# Patient Record
Sex: MALE | Race: WHITE
[De-identification: names, ages, dates, MRNs, and addresses within clinical notes are randomized per-mention and may not be internally consistent; named-entity substitution may affect disease eponyms.]

---

## 2019-08-28 ENCOUNTER — HOSPITAL ENCOUNTER (INPATIENT)
Dept: HOSPITAL 1 - ED | Age: 74
LOS: 4 days | Discharge: ANOTHER HOSPITAL | DRG: 853 | End: 2019-09-01
Attending: INTERNAL MEDICINE | Admitting: INTERNAL MEDICINE
Payer: COMMERCIAL

## 2019-08-28 VITALS
HEIGHT: 71 IN | BODY MASS INDEX: 32.72 KG/M2 | WEIGHT: 233.69 LBS | BODY MASS INDEX: 32.72 KG/M2 | HEIGHT: 71 IN | WEIGHT: 233.69 LBS

## 2019-08-28 DIAGNOSIS — E86.0: ICD-10-CM

## 2019-08-28 DIAGNOSIS — M72.6: ICD-10-CM

## 2019-08-28 DIAGNOSIS — E87.6: ICD-10-CM

## 2019-08-28 DIAGNOSIS — E11.65: ICD-10-CM

## 2019-08-28 DIAGNOSIS — H40.9: ICD-10-CM

## 2019-08-28 DIAGNOSIS — A41.9: Primary | ICD-10-CM

## 2019-08-28 DIAGNOSIS — N40.1: ICD-10-CM

## 2019-08-28 DIAGNOSIS — D64.9: ICD-10-CM

## 2019-08-28 DIAGNOSIS — E87.1: ICD-10-CM

## 2019-08-28 DIAGNOSIS — I10: ICD-10-CM

## 2019-08-28 DIAGNOSIS — N17.0: ICD-10-CM

## 2019-08-28 DIAGNOSIS — R33.8: ICD-10-CM

## 2019-08-28 DIAGNOSIS — I65.22: ICD-10-CM

## 2019-08-28 DIAGNOSIS — E43: ICD-10-CM

## 2019-08-28 DIAGNOSIS — M10.9: ICD-10-CM

## 2019-08-28 DIAGNOSIS — Z79.84: ICD-10-CM

## 2019-08-28 DIAGNOSIS — J06.9: ICD-10-CM

## 2019-08-28 DIAGNOSIS — N45.1: ICD-10-CM

## 2019-08-28 DIAGNOSIS — R55: ICD-10-CM

## 2019-08-28 LAB
ALBUMIN SERPL-MCNC: 2.1 G/DL (ref 3.4–5)
ALP SERPL-CCNC: 89 U/L (ref 46–116)
ALT SERPL-CCNC: 18 U/L (ref 16–63)
AST SERPL-CCNC: 16 U/L (ref 15–37)
BASOPHILS NFR BLD: 0 % (ref 0–2)
BILIRUB SERPL-MCNC: 0.5 MG/DL (ref 0.2–1)
BUN SERPL-MCNC: 39 MG/DL (ref 7–18)
CALCIUM SERPL-MCNC: 7.8 MG/DL (ref 8.5–10.1)
CHLORIDE SERPL-SCNC: 94 MMOL/L (ref 98–107)
CO2 SERPL-SCNC: 26.3 MMOL/L (ref 21–32)
CREAT SERPL-MCNC: 3 MG/DL (ref 0.7–1.3)
ERYTHROCYTE [DISTWIDTH] IN BLOOD BY AUTOMATED COUNT: 13.9 % (ref 11.5–14.5)
GFR SERPLBLD BASED ON 1.73 SQ M-ARVRAT: (no result) ML/MIN
GLUCOSE SERPL-MCNC: 440 MG/DL (ref 74–106)
MICROSCOPIC UR-IMP: YES
MONOCYTES NFR BLD: 5 % (ref 0–7)
NEUTS BAND NFR BLD: 6 % (ref 0–10)
NEUTS SEG NFR BLD MANUAL: 78 % (ref 37–75)
PLATELET # BLD: 203 X10^3MCL (ref 130–400)
POTASSIUM SERPL-SCNC: 3.2 MMOL/L (ref 3.5–5.1)
PROT SERPL-MCNC: 6.8 G/DL (ref 6.4–8.2)
RBC # UR STRIP.AUTO: NEGATIVE /UL
RBC MORPH BLD: NORMAL
SODIUM SERPL-SCNC: 135 MMOL/L (ref 136–145)
UA SPECIFIC GRAVITY: 1.01 (ref 1–1.03)

## 2019-08-28 PROCEDURE — G0378 HOSPITAL OBSERVATION PER HR: HCPCS

## 2019-08-28 PROCEDURE — A4628 OROPHARYNGEAL SUCTION CATH: HCPCS

## 2019-08-28 PROCEDURE — C9113 INJ PANTOPRAZOLE SODIUM, VIA: HCPCS

## 2019-08-28 NOTE — NUR
PT BIBA WITH C/O NEAR SYNCOPAL EPISODE. PT STS I WAS GETTING UP FROM COUCH AND
BECAME WEAK AND FELL TO MY KNEES. DENIES ANY LOC. DENIES ANY TRAUMA. PT STS AMIRAH
BEEN FEELING SICK SINCE SUNDAY. PT STS HAVING TESTICLE PAIN AND SWELLING X2
DAYS. PER MEDIC, PTS BS TAKEN EN ROUTE 505. PER MEDIC, IV INSERTED TO LFA G 18,
300ML BOLUS GIVEN EN ROUTE. PT IS A/O X4, SPEECH CLEAR AND APPROPRIATE. SCROTAL
EDEMA NOTED, SIZE OF BASEBALL. PLACED ON FULL CM. WILL CONTINUE TO MONITOR.

## 2019-08-29 VITALS — DIASTOLIC BLOOD PRESSURE: 67 MMHG | SYSTOLIC BLOOD PRESSURE: 101 MMHG

## 2019-08-29 VITALS — SYSTOLIC BLOOD PRESSURE: 101 MMHG | DIASTOLIC BLOOD PRESSURE: 62 MMHG

## 2019-08-29 VITALS — DIASTOLIC BLOOD PRESSURE: 59 MMHG | SYSTOLIC BLOOD PRESSURE: 104 MMHG

## 2019-08-29 VITALS — SYSTOLIC BLOOD PRESSURE: 120 MMHG | DIASTOLIC BLOOD PRESSURE: 74 MMHG

## 2019-08-29 VITALS — DIASTOLIC BLOOD PRESSURE: 76 MMHG | SYSTOLIC BLOOD PRESSURE: 157 MMHG

## 2019-08-29 VITALS — SYSTOLIC BLOOD PRESSURE: 149 MMHG | DIASTOLIC BLOOD PRESSURE: 73 MMHG

## 2019-08-29 VITALS — DIASTOLIC BLOOD PRESSURE: 60 MMHG | SYSTOLIC BLOOD PRESSURE: 96 MMHG

## 2019-08-29 VITALS — DIASTOLIC BLOOD PRESSURE: 74 MMHG | SYSTOLIC BLOOD PRESSURE: 120 MMHG

## 2019-08-29 VITALS — SYSTOLIC BLOOD PRESSURE: 137 MMHG | DIASTOLIC BLOOD PRESSURE: 75 MMHG

## 2019-08-29 VITALS — SYSTOLIC BLOOD PRESSURE: 95 MMHG | DIASTOLIC BLOOD PRESSURE: 58 MMHG

## 2019-08-29 VITALS — SYSTOLIC BLOOD PRESSURE: 152 MMHG | DIASTOLIC BLOOD PRESSURE: 76 MMHG

## 2019-08-29 VITALS — DIASTOLIC BLOOD PRESSURE: 65 MMHG | SYSTOLIC BLOOD PRESSURE: 109 MMHG

## 2019-08-29 LAB
BUN SERPL-MCNC: 41 MG/DL (ref 7–18)
CALCIUM SERPL-MCNC: 7.4 MG/DL (ref 8.5–10.1)
CHLORIDE SERPL-SCNC: 96 MMOL/L (ref 98–107)
CO2 SERPL-SCNC: 26.9 MMOL/L (ref 21–32)
CREAT SERPL-MCNC: 3.1 MG/DL (ref 0.7–1.3)
ERYTHROCYTE [DISTWIDTH] IN BLOOD BY AUTOMATED COUNT: 14.3 % (ref 11.5–14.5)
GFR SERPLBLD BASED ON 1.73 SQ M-ARVRAT: (no result) ML/MIN
GLUCOSE SERPL-MCNC: 459 MG/DL (ref 74–106)
MAGNESIUM SERPL-MCNC: 1.4 MG/DL (ref 1.8–2.4)
MAGNESIUM SERPL-MCNC: 1.5 MG/DL (ref 1.8–2.4)
NEUTS BAND NFR BLD: 3 % (ref 0–10)
NEUTS SEG NFR BLD MANUAL: 94 % (ref 37–75)
PHOSPHATE SERPL-MCNC: 2.9 MG/DL (ref 2.5–4.9)
PHOSPHATE SERPL-MCNC: 4.2 MG/DL (ref 2.5–4.9)
PLATELET # BLD: 195 X10^3MCL (ref 130–400)
POTASSIUM SERPL-SCNC: 3.8 MMOL/L (ref 3.5–5.1)
RBC MORPH BLD: NORMAL
SODIUM SERPL-SCNC: 135 MMOL/L (ref 136–145)

## 2019-08-29 PROCEDURE — 0V950ZZ DRAINAGE OF SCROTUM, OPEN APPROACH: ICD-10-PCS | Performed by: SURGERY

## 2019-08-29 NOTE — NUR
TITRATED FENT TO 0.5 MCG/KG/HR AND VERSED TO 0.5 MG/HR DUE TO PT'S BLOOD
PRESSURE TRENDING DOWN OF MAP TO HIGH 60'S.

## 2019-08-29 NOTE — NUR
RECEIVED A CALL FROM SIMRAN(LAB STAFF) WITH CRITICAL RESULT LACTIC-2.6.
PAGED (RESIDENT) ASSIGNED TO THIS PT, AWAITING FOR HER RETURN
CALL.
RADHA RN ASSIGNED TO THIS PT MADE AWARE OF ABOVE.

## 2019-08-29 NOTE — NUR
PT BACK FROM CT ACCOMPANIED BY RT AND SURU RN. PER SURU RN, PT BECAME AGITATED
AND RESTLESS AND TITRATED FENT TO 0.8 MCG/KG/HR AND VERSED TO 0.8 MG/HR. VSS
STABLE. NO ACUTE DISTRESS NOTED.

## 2019-08-29 NOTE — NUR
PER DR. TODD TO PLACE OGT INTO PT FOR TUBE FEEDINGS. OGT 16 [place] MEASURED,
INSERTED, AND AUSCULTATED. ORDER FOR KUB ORDERED AND AWAITING FOR
VERIFICATION.

## 2019-08-29 NOTE — NUR
PT C/O 10/10 TESTICULAR PAIN DESCRIBED AS THROBBING. MEDICATED WITH PRN
DILAUDID PER MAR. /57, HR 90. REASSESSED PAIN. PT STATES NO RELIEF OF
PAIN RATES PAIN AT 8/10. STATES FEELING WARM. TEMP CHECK 99.1, AC TURNED ON.
CURRENT BP 97/60, HR 89, RESP 19. REQUESTING ICE PACK FOR TESTICLES, ICE PACK
PROVIDED. WILL CONTINUE TO MONITOR.

## 2019-08-29 NOTE — NUR
RECEIVED PT FROM NIGHT SHIFT NURSE. PT RESTING IN BED, AOX4, RESP E/U ON RA.
CURRENTYL UNDERGOING ULTRASOUND AT BEDSIDE. NO SIGNS OF ACUTE DISTRESS NOTED.
IV TO LFA W/ NO SIGNS OF INFILTRATION, IVF INFUSING WELL. BED IN LOWEST
POSITION AND CALL LIGHT WITHIN REACH. WILL CONTINUE TO MONITOR.

## 2019-08-29 NOTE — NUR
PHYSICAL THERAPY NOTE
ATTEMPTED FOR PHYSICAL THERAPY EVAL AS REQUESTED; PATIENT IS TAKEN FOR MEDICAL
PROCEDURE PER RADHA SOTO.

## 2019-08-29 NOTE — NUR
REC'D REPORT FROM EVELIO SOTO TO ASSUME CARE. PT INTUBATED AND SEDATED ON VERSED
0.8 MG/HR, FENTANYL 0.8 MCG/KG/HR WITH RSS 4. PT ABLE TO FOLLOW COMMANDS WHEN
AWAKE. PERRLA NOTED. 8.0 ETT SECURED AND 26 CM LL. NO JVD NOTED. TRACHEA
MIDLINE. OGT INTACT AND PATENT. RIJ CVC INTACT, PORTS PATENT, DSG CDI.  ETT TO
VENT: AC MODE RATE 12, , PEEP 5, FIO2 40%. RESPS E/U. CHEST RISE EQUAL
AND SYMMETRICAL. LUNG SOUNDS EXP CLEAR BUL, DIM BASES. CARDIAC MONITOR IN
PLACE SHOWING NSR WITH HR 83. /67 MAP 78. PULSES PALPABLE X4.  CAP
REFILL < 3 SECS. NO EDEMA NOTED. IVF NS INFUSING  ML/HR. BLE SCDS IN
PLACE. TF WILL BE INITIATED TONIGHT. ABD ROUND, SOFT, NONTENDER TO TOUCH.
BOWEL SOUNDS ACTIVE. NO N/V NOTED. F/C INTACT AND DRAINING VIA GRAVITY YELLOW
URINE. SCROTAL EDEMA NOTED.  NO PENILE DISCHARGE NOTED. S/P I&D TODAY TO
SCROTUM FOR NECROTIZING FASCITIS, MODERATE AMT OF SEROSANGUINOUS DRAINAGE
NOTED ON CHUX, F/C CARE PROVIDED, LINENS CHANGED. GEN WEAKNESS NOTED. TURNED
AND REPOSITIONED Q2H FOR PRESSURE RELIEF. PTS RUE RESTRAINTS TAKEN OFF AT THIS
TIME, LUE RESTRAINT IN PLACE. PTS WIFE AT BEDSIDE. PT AGREES TO NOT PULL OUT
ANY TUBING OR MEDICAL DEVICE. PT IN FULL VIEW OF NURSING STATION FOR CLOSE
MONITORING. ALL NEEDS MET AT THIS TIME. WILL CONTINUE TO MONITOR.

## 2019-08-29 NOTE — NUR
PT LACTIC ACID CAME BACK, CURRENT LACTIC ACID 3.8. DR MOON MADE AWARE. NO
NEW ORDERS RECEIVED. WILL CONTINUE TO MONITOR.

## 2019-08-29 NOTE — NUR
PT ARRIVED FROM OR S/P I&D OF SCROTUM ACCOMPANIED BY OR NURSES AT THIS TIME.
PT INTUBATED WITH NO SEDATION. UNABLE TO FOLLOW COMMANDS. PUPILS 2MM IN
SIZE AND SLUGGISH IN RESPONSE TO LIGHT B/E. 8.0 ETT @ 26 LL. BREATHING E/U.
SYMM CHEST WALL EXPANSION NOTED. NO S/S OF RESP DISTRESS NOTED. VS: TEMP =
98.2, HR = 101, BP = 152/76 (91), RR = 17, O2 = 98%. MOD PALPABLE PULSES X4.
CAP REFILL <3 SEC. RIJ TLC CVC INTACT, X3 PORTS PATENT, DRESSING CDI. PIV TO R
HAND AND L WRIST, PORTS PATENT, DRESSINGS CDI. F/C INTACT AND DRAINING VIA
GRAVITY. URINE IS YELLOW IN COLOR. INCISION NOTED TO SCROTUM WITH IODOFORM
PACKING AND ABD PAD DRESSING. WILL ASSUME CARE OF PT.

## 2019-08-29 NOTE — NUR
B/E SOFT WRIST RESTRAINTS APPLIED TO PT FOR PT'S SAFETY. TWO-FINGER WIDTH
UNDER RESTRAINTS, PT ABLE TO MOVE FINGERS, NO CYANOSIS PRESENT, CAP REFILL <3
SEC TO BUE NOTED. SEE RESTRAINT SHEET FOR DETAILS.

## 2019-08-29 NOTE — NUR
RECEIVED PT FROM [facility], CAME IN DUE TO SYNCOPE, FEVER, AND SCROTAL
EDEMA. AAOX4. DENIES HEADACHE/DIZZINESS. ABLE TO FOLLOW COMMANDS. SPEECH IS
CLEAR. NO FACIAL DROOP. HAND  EQUAL. NO ARM DRIFT NOTED. NO SOB, LUNG
SOUNDS CTA, O2 SAT=98%, RA. DENIES CHEST PAIN/PRESSURE, SR ON THE MONITOR.
STATED THAT HE HAD NAUSEA SINCE SATURDAY AND HAD DIARRHEAL EPISODES X2-3 DAYS.
LAST BM YESTERDAY AND HAD X1 DIARRHEAL EPISODE. ABDOMEN IS SOFT AND ROUND.
DENIES DYSURIA BUT SCROTAL EDEMA NOTED. C/O 10/10 THROBBING AND PRESSURE
TESTICULAR PAIN. IV SITE PATENT AND INTACT. SIDE RAILS UPX2. CALL LIGHT ON
REACH. HOB ELEVATED AT 30 DEG. WIFE AT BEDSIDE. TEMP-99.4. ENDORSED TO PRIMARY
NURSE MOISE FOR CONTINUITY OF CARE

## 2019-08-29 NOTE — NUR
CLARIFIED ORDER FOR SECOND NS BOLUS WITH DR VENTURA, PER DR. ORDER IS
CORRECT DUE TO PT BEING ON SEPSIS PROTOCAL. 1000CC BOLUS NS GIVEN. PT HAS NOT
URINATED SINCE ARRIVED TO UNIT. DENIES FEELING TO URINATE. BLADDER SCAN DONE,
SHOWS 200CC. NO BLADDER DISTENTION NOTED. DENIES PAIN ON PALPATION. WILL
CONTINUE TO MONITOR URINE OUTPUT. STATES SCROTAL PAIN IS BETTER. PROVIDED ICE
PACK FOR TESTICLES, PT STATES ICE PACKS ARE HELPING WITH PAIN. ALL NEEDS
ASSESSED AND ATTENDED. NO ACUTE DISTRESS NOTED. BED AT LOWEST SETTING. SIDE
RAILS X2 UP. CALL LIGHT WITHING REACH. WILL ENDORSE CARE TO AM NURSE.

## 2019-08-30 VITALS — DIASTOLIC BLOOD PRESSURE: 65 MMHG | SYSTOLIC BLOOD PRESSURE: 111 MMHG

## 2019-08-30 VITALS — SYSTOLIC BLOOD PRESSURE: 104 MMHG | DIASTOLIC BLOOD PRESSURE: 71 MMHG

## 2019-08-30 VITALS — DIASTOLIC BLOOD PRESSURE: 56 MMHG | SYSTOLIC BLOOD PRESSURE: 88 MMHG

## 2019-08-30 VITALS — SYSTOLIC BLOOD PRESSURE: 128 MMHG | DIASTOLIC BLOOD PRESSURE: 71 MMHG

## 2019-08-30 VITALS — SYSTOLIC BLOOD PRESSURE: 99 MMHG | DIASTOLIC BLOOD PRESSURE: 72 MMHG

## 2019-08-30 VITALS — SYSTOLIC BLOOD PRESSURE: 158 MMHG | DIASTOLIC BLOOD PRESSURE: 95 MMHG

## 2019-08-30 VITALS — SYSTOLIC BLOOD PRESSURE: 98 MMHG | DIASTOLIC BLOOD PRESSURE: 64 MMHG

## 2019-08-30 VITALS — DIASTOLIC BLOOD PRESSURE: 59 MMHG | SYSTOLIC BLOOD PRESSURE: 97 MMHG

## 2019-08-30 VITALS — SYSTOLIC BLOOD PRESSURE: 91 MMHG | DIASTOLIC BLOOD PRESSURE: 55 MMHG

## 2019-08-30 VITALS — DIASTOLIC BLOOD PRESSURE: 76 MMHG | SYSTOLIC BLOOD PRESSURE: 125 MMHG

## 2019-08-30 VITALS — SYSTOLIC BLOOD PRESSURE: 167 MMHG | DIASTOLIC BLOOD PRESSURE: 87 MMHG

## 2019-08-30 VITALS — SYSTOLIC BLOOD PRESSURE: 103 MMHG | DIASTOLIC BLOOD PRESSURE: 63 MMHG

## 2019-08-30 VITALS — DIASTOLIC BLOOD PRESSURE: 64 MMHG | SYSTOLIC BLOOD PRESSURE: 98 MMHG

## 2019-08-30 LAB
BUN SERPL-MCNC: 45 MG/DL (ref 7–18)
CALCIUM SERPL-MCNC: 6.8 MG/DL (ref 8.5–10.1)
CHLORIDE SERPL-SCNC: 104 MMOL/L (ref 98–107)
CO2 SERPL-SCNC: 28 MMOL/L (ref 21–32)
CREAT SERPL-MCNC: 2.1 MG/DL (ref 0.7–1.3)
ERYTHROCYTE [DISTWIDTH] IN BLOOD BY AUTOMATED COUNT: 14.6 % (ref 11.5–14.5)
GFR SERPLBLD BASED ON 1.73 SQ M-ARVRAT: (no result) ML/MIN
GLUCOSE SERPL-MCNC: 174 MG/DL (ref 74–106)
MAGNESIUM SERPL-MCNC: 1.7 MG/DL (ref 1.8–2.4)
MONOCYTES NFR BLD: 5 % (ref 0–7)
NEUTS BAND NFR BLD: 6 % (ref 0–10)
NEUTS SEG NFR BLD MANUAL: 85 % (ref 37–75)
PHOSPHATE SERPL-MCNC: 2.6 MG/DL (ref 2.5–4.9)
PLAT MORPH BLD: (no result)
PLATELET # BLD: 195 X10^3MCL (ref 130–400)
POTASSIUM SERPL-SCNC: 3.3 MMOL/L (ref 3.5–5.1)
RBC MORPH BLD: NORMAL
SODIUM SERPL-SCNC: 139 MMOL/L (ref 136–145)

## 2019-08-30 NOTE — NUR
Recommendation(s):
1. Add Ensure High Protein BID
2. Add Abraham QD
3. Spoke w/ NP Tk regarding ONS, confirmed.

## 2019-08-30 NOTE — NUR
BEDSIDE SWALLOW SCREEN COMPLETED. PATIENT ABLE TO TOLERATE ALL CONSISTENCIES
OF FOOD WITHOUT ANY COUGHING, GAGGING OR CHOKING.  ELIER NP ON UNIT AND MADE
AWARE. NEW ORDERS RECEIVED FOR [facility] DIET.

## 2019-08-30 NOTE — NUR
PT EXTUBATED @ 1055. NO STRIDOR, LUNG SOUNDS CLEAR BILATERAL UPPER LOBES,
DIMINISHED BILATERAL LOWER LOBES. CHEST EXPANSION SYMMETRIC, NO S/S DISTRESS
NOTED. SPO2 98% ON 2L NASAL CANNULA, RR 13. PT AND HIS WIFE EDUCATED ON USE OF
INCENTIVE SPIROMETER: WILL REINFORCE TEACHING & CONTINUE TO MONITOR PT.

## 2019-08-30 NOTE — NUR
Initial Nutrition Assessment: (IC04-A) ISIAH PEREZ 74M
 
 Dx: Sepsis, Renal failure
 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
(no stroke Hx)
 PSHx: hernia repair (R inguinal w/o copmplications), R hand surgery tendon
reattachemnt 2/2 trauma 1975, L hand surgery 2/2 trigger finger w/o
reccurence, Tonsillectomy at age 11 (no complications)
Labs:  H, BUN 45 H, Cr 2.1 H, Alb 2.1 L, Ca 6.8 L, Mg 1.7 L, .62
H, A1c 9.6 H, PT 11.3 H
 Meds: Colace, Humulin, Lantus, Lipitor, Proscar, Protonix, Vancomycin, Zofran
Diet: NPO (TF), Glucerna 1.2 @10mL/hr (goal 35mL), adv q4hrs, FWF 50mL q4hrs
PO intake since admission: NPO
 Ht: 71in Wt: 239# BMI: 33.5 Bed scale: 134 kg/295#
IBW: 172# %IBW: 139% UBW: 210#
 Age: 74
 Food Allergies: NKFA
 Skin: Dressing to scrotum noted under , IV & RIJ sites WNL
Buddy: 15
 Edema: None noted
 GI: BS active all quadrants, no BM at this time, Abd round, soft, nontender
to palpation
Last BM: 8/28 RD Note (8/30):
 Noted pt now at 30mL/hr (TF), per shift reassessment. Per MD Consult, scrotal
wall thickening/edema > sepsis 2/2 acute necrotizing fascitis s/p
I&D/fasciotomy. Pt discussed during bed huddles, still intubated, possible
surgery on Monday (9/02), multiple lesions on kidneys and liver,
hydronephrosis. Visited pt bedside w/ wife present. Pt was receiving Glucerna
1.2 TF, but improved enough for PO diet - CCHO diet transmitted for lunch. RN
stated pt passed swallow eval, able to tolerate all consistencies of food w/o
coughing. Pt states appetite is usually good, but right before receiving
lunch, appetite is low, seen only eating a bit of jello. Pt's wife states
monitors his BG every day, and controls his DM with his diet (low CHO, low
sugar) and exercise. Wife states pt's BG baseline is 190-210, was around 500s
when admitted to ER. Wife also states pt takes Januvia and Metformin for his
DM. Pt no c/o significant pain at this time. Pt approrpiate for diet
supplementation r/t wound healing. Spoke w/ NP Tk regarding ONS,
confirmed.
 
 Problem with N/V/D/C: Constipation
 Problems with: Chewing: No Swallowing: No
 Current appetite: Alright
Recent wt change: N/A %wt change: N/A
Vitamin/Supplement use: MVM (One A Day for Men)
Special diet at home: Low CHO, low sugar
Physical activity: Walk 30min in neighborhood every night
Nutrition education given (specify specific nutrition education and handout
given): None given at this time.
 
 Food-drug interactions? Education given? None given at this time.
 
 Estimated Nutritional Needs Based on actual body weight (109 kg)
 Energy: 9010-0053 kcal/day (25-30 kcal/kg for ICU)
 Protein: 131-164 g/day (1.2-1.5 g/kg wound healing, preserve LBM)
 Fluid: 0867-4679 (1mL/kcal) or per MD
 
Nutrition Diagnosis:
1. Increased nutrient needs r/t wound healing AEB scrotal acute necrotizing
fascitis of scrotal area s/p I&D/fasciotomy
 
Intervention
1. Add Ensure High Protein BID
2. Add Abraham QD
3. Spoke w/ NP Tk regarding ONS, confirmed.
 
Monitor/Evaluate
 Goal: Meet at least 75% of estimated needs, adequate wound healing
 Monitor: PO intake, Labs, GI function, wound healing
 F/U in 2-3 days as high risk 9/01-9/02

## 2019-08-30 NOTE — NUR
SEDATION DECREASED TO FENTANYL 0.25 MCG/KG/HR, VERSED 0.25 MG/HR PER DR TODD
FOR WEANING TRIALS IN AM.

## 2019-08-30 NOTE — NUR
PTS CHUX NOTED WITH SEROSANGUIENOUS DRAINIAGE, MODERATE AMT, LINENS CHANGED,
DSG REINFORCED TO SCROTAL AREA. WILL CONTINUE TO MONITOR.

## 2019-08-30 NOTE — NUR
PT HAD SMALL AMOUNT OF EMESIS X2 AFTER RECEIVING PO HYDRALAZINE. DR. CAR
NOTIFIED DUE TO PT'S BP STILL BEING ELEVATED. NEW ORDER MADE FOR HYDRALAZINE
HCL 10MG IVP PRN AND GIVEN TO PT, /90. WILL CONTINUE TO MONITOR.

## 2019-08-30 NOTE — NUR
PATIENT'S BP TRENDING UP. CURRENT /93 (). TELEPHONED RESDIENT
CALL PHONE AND SPOKE WITH DR MARCELINO AND REPORTED. AWAITING NEW ORDERS.
PATIENT ALSO C/O FEELING NAUSEATED. ADMINISTERED 4MG ZOFRAN IVP FOR NAUSEA.

## 2019-08-30 NOTE — NUR
DR TODD AT BEDSIDE, UPDATED ON STATUS, PER DR TODD START WEANING TRIALS
TODAY ON CPAP, ABG ON CPAP, THEN POSSIBLY EXTUBATE. PTS SEDATION DECREASED TO
FENTANYL 0.5 MCG/KG/HR AND VERSED 0.5 MG/HR.

## 2019-08-31 VITALS — SYSTOLIC BLOOD PRESSURE: 124 MMHG | DIASTOLIC BLOOD PRESSURE: 64 MMHG

## 2019-08-31 VITALS — SYSTOLIC BLOOD PRESSURE: 148 MMHG | DIASTOLIC BLOOD PRESSURE: 69 MMHG

## 2019-08-31 VITALS — SYSTOLIC BLOOD PRESSURE: 134 MMHG | DIASTOLIC BLOOD PRESSURE: 50 MMHG

## 2019-08-31 VITALS — SYSTOLIC BLOOD PRESSURE: 149 MMHG | DIASTOLIC BLOOD PRESSURE: 73 MMHG

## 2019-08-31 VITALS — DIASTOLIC BLOOD PRESSURE: 83 MMHG | SYSTOLIC BLOOD PRESSURE: 152 MMHG

## 2019-08-31 VITALS — SYSTOLIC BLOOD PRESSURE: 158 MMHG | DIASTOLIC BLOOD PRESSURE: 75 MMHG

## 2019-08-31 VITALS — SYSTOLIC BLOOD PRESSURE: 163 MMHG | DIASTOLIC BLOOD PRESSURE: 79 MMHG

## 2019-08-31 VITALS — DIASTOLIC BLOOD PRESSURE: 73 MMHG | SYSTOLIC BLOOD PRESSURE: 129 MMHG

## 2019-08-31 LAB
ALBUMIN SERPL-MCNC: 1.5 G/DL (ref 3.4–5)
BUN SERPL-MCNC: 32 MG/DL (ref 7–18)
CALCIUM SERPL-MCNC: 7.2 MG/DL (ref 8.5–10.1)
CHLORIDE SERPL-SCNC: 105 MMOL/L (ref 98–107)
CO2 SERPL-SCNC: 24.4 MMOL/L (ref 21–32)
CREAT SERPL-MCNC: 1.4 MG/DL (ref 0.7–1.3)
ERYTHROCYTE [DISTWIDTH] IN BLOOD BY AUTOMATED COUNT: 14.7 % (ref 11.5–14.5)
GFR SERPLBLD BASED ON 1.73 SQ M-ARVRAT: (no result) ML/MIN
GLUCOSE SERPL-MCNC: 198 MG/DL (ref 74–106)
MAGNESIUM SERPL-MCNC: 1.7 MG/DL (ref 1.8–2.4)
MONOCYTES NFR BLD: 4 % (ref 0–7)
NEUTS BAND NFR BLD: 5 % (ref 0–10)
NEUTS SEG NFR BLD MANUAL: 88 % (ref 37–75)
PHOSPHATE SERPL-MCNC: 2.9 MG/DL (ref 2.5–4.9)
PLAT MORPH BLD: (no result)
PLATELET # BLD: 245 X10^3MCL (ref 130–400)
POTASSIUM SERPL-SCNC: 2.9 MMOL/L (ref 3.5–5.1)
RBC MORPH BLD: NORMAL
SODIUM SERPL-SCNC: 142 MMOL/L (ref 136–145)

## 2019-08-31 PROCEDURE — 0HBAXZZ EXCISION OF INGUINAL SKIN, EXTERNAL APPROACH: ICD-10-PCS | Performed by: SURGERY

## 2019-08-31 PROCEDURE — 0HB9XZZ EXCISION OF PERINEUM SKIN, EXTERNAL APPROACH: ICD-10-PCS | Performed by: SURGERY

## 2019-08-31 NOTE — NUR
ELIER NP AT BEDSIDE TO ASSESS PATIENT. POC DISCUSSED BEDSIDE WITH PATIENT AND
WIFE. ALL QUESTIONS AND CONCERNS ADDRESSED. LABS REVIEWED BY YAZ THAPA WITH
NEW ORDERS RECEIVED. WILL CARRY OUT ORDERS.

## 2019-08-31 NOTE — NUR
PT CLEANED AND ALL LINENS AND GOWN CHANGED. MODERATE AMOUNT OF SEROSANGUINOUS
DRAINAGE NOTED TO CHUCKS. NEW CHUCKS PROVIDED AND SCROTAL AREA REINFORCED.
OUTPUT IN SMITH, 1650CC OF YELLOW URINE.

## 2019-08-31 NOTE — NUR
SPOKE WITH JOANNA AT [facility] [phone]. PATIENT INFORMATION
PROVIDED REGARDING PATIENT TRANSFER. FAXED PATIENT INFORMATION TO [phone]
[phone]. AWAITING RETURN TELEPHONE CALL.

## 2019-08-31 NOTE — NUR
PT C/O PAIN 7/10 GENERALIZED IN PERINEAL AREA AND N/V.
 
ADM (SEE MAR) MORPHINE AND PRN ZOFRAN. PT NOTED FEELING BETTER PAIN REFLIEF
AND RELIEF OF N/V. WILL CONT TO MONITOR.

## 2019-08-31 NOTE — NUR
RECEIVED PT'S REPORT FROM LEAVING NURSE. PT'S WIFE AT BEDSIDE. PT COMPLAIN OF
GENERALIZED PAIN 10/10, MORPHINE 2MG GIVEN BY NIGHT SHIFT NURSE. PT PAIN IS
RELEASING SOME AT THIS TIME, DOSING ON AND OFF, EASILY AROUSED VIA AUDITORY
STIMULI WITH VERBALLY COMMUNICATION.  PT BREATHING ON O2 2L VIA NC, EVEN,
UNLABORED. SMITH IN PLACE, DRAINING VIA GRAVITY, URINE COLOR YELLOW. INCISION
ON SCROTUM CDI AT THIS TIME. IVF NS INFUSING AT 120ML/HR  VIA CENTRAL LINE
RIJ.  WILL CONTINUE TO MONITOR.

## 2019-08-31 NOTE — NUR
SPOKE WITH MAUREEN AT [facility] WHO STATED TO REFAX PATIENT
INFORMATION TO [phone].

## 2019-08-31 NOTE — NUR
PT'S BP ELEVATED /81, HR 79 BPM. HYDRALAZINE 50 MG GIVEN, WHICH WAS HELD
THIS MORNING BEFORE SURGERY.

## 2019-08-31 NOTE — NUR
SPOKE WITH SUNIL ZACARIAS AT [phone] REQUESTING BED. CLINICAL INFORMATION
(INCLUDING COVER SHEET) FAXED TO [phone]. AWAITING RETURN TELEPHONE
CALL.

## 2019-08-31 NOTE — NUR
PT WIFE LOUIE [phone] STATED TO NOTIFY HER IF PT TRANSFER AND OR CALL SHE
GOING HOME FOR THE EVENING. PT RESTING CALMLY IN BED NO S/S OF ACUTE DISTRESS.
PT NOTIFIED OF FEELING A LITTLE BETTER THAN PREVIOUS DAY.

## 2019-08-31 NOTE — NUR
RECIEVED REPORT FROM CHARLES ALEXANDRE. NURSING UPDATES. POC DISCUSSED. RESUMED CARE OF
PT. SEE SHIFT ASSESSMENT FOR ASSESSMENT.

## 2019-08-31 NOTE — NUR
DR. COFFEY SAW PT AND OPENED DRESSING ON SCROTUM AND STATED PT NEED A SECOND
SURGERY. DRESSING ON INCISION SITE LEFT OPEN PER DR. COFFEY.  WAITING FOR
ORDER.

## 2019-08-31 NOTE — NUR
SPOKE WITH TRANSFER CENTER AT [facility] AT [phone].
FACESHEET FAXED OVER WITH PATIENT INFORMATION. AWAITING RETURN CALL.

## 2019-08-31 NOTE — NUR
RECEIVED PATIENT BACK FROM OR VIA BED ACCOMPANIED BY MIA SOTO. PATIENT IS
DROWSY BUT AROUSABLE. PT ON O2 6L W/ MASK O2 SAT 96%. PATIENT PLACED ON O2 3L
NC O2 SAT 96%. FLUIDS RESUMED PER DR LOPEZ. VITALS TAKEN AND STABLE (SEE
DOCUMENTATION). SCDS APPLIED. ALL QUESTIONS AND CONCERNS ADDRESSED.

## 2019-08-31 NOTE — NUR
PHYSICAL THERAPY NOTE
ATTEMPTED PHYSICAL THERAPY SCHEDULED SESSION; NSG REQUESTED TO HOLD PHYSICAL
THERAPY SESSION AT THIS TIME

## 2019-08-31 NOTE — NUR
PT'S PRE-OP REPORT GIVEN TO OR NURSE BELLAMY, MADE OR NURSE AWARE MORPHINE,
ZOFRAN, AND PROTONIX ARE GIVEN AROUND 0900AM. PT'S VS STABLE AT THIS TIME.

## 2019-08-31 NOTE — NUR
PER OR NURSE REPORT: PT RECEIVED EXCISIONAL DEBRIDEMENT OF SCROTAL  Excisional
debridement of scrotal and perineal necrotizing fasciitis. NO NEW INCISIONS,
PREVIOUS INCISION IS PACKED.  EBL 100ML, URINE OUT 600ML, LR 500ML GIVEN IN
OR.  NO ADDITIONAL ANTIBIOTICS.

## 2019-08-31 NOTE — NUR
SPOKE WITH [facility] FOR BED REQUEST AT [phone]. THE WILL BE FAXING
CLINICAL INTAKE FORMS TO ICU DIRECTLY FOR COMPLETION.

## 2019-08-31 NOTE — NUR
ELIER MUKHERJEE AT BEDSIDE SPEAKING WITH PATIENT AND HIS WIFE REGARDING EXCISIONAL
DEBRIDEMENT OF SCROTUM.

## 2019-08-31 NOTE — NUR
SPOKE WITH MAUREEN AT [facility]. THEY WILL NOT ACCEPT PATIENT
AS THEY FEEL THAT HE IS IN NEED OF A TERTIARY CENTER.

## 2019-09-01 NOTE — NUR
[facility] TEAM @ BEDSIDE FOR PICKUP. NURSING UPDATES. POC DISCUSSED. [facility] TEAM LEFT
W/ PT TO SUNIL ZACARIAS. VS STABLE WNL. PT DENIES ANY NEW SYMPTOMS.

## 2019-09-01 NOTE — NUR
ATTEMPTED TO CALL [facility] FOR REPORT. STATED BED WAS NOT READY AND WOULD
NOTIFY BACK WHEN BED WAS READY. NOTIFIED TRANSFER CENTER [phone] W/ GUERLINE TO
NOTIFY US WHEN BED WAS READY. LELAND GIORDANO RN NOTIFIED [facility] TEAM FOR PICKUP WHEN
WE CALL TO NOTIFIY BED READY FOR TRANSFER. AWAITING CALL FROM [facility]/TRANSFER CENTER.

## 2019-09-01 NOTE — NUR
REPORT GIVEN TO RN MARY @ SUN[facility] ZACARIAS. NURSING UPDATES. POC DISCUSSED. AWAITING
ARRIVAL AS TRANSPORT TEAM. NOTIFIED WIFE LOUIE OF TRANSFER ORDER.

## 2020-06-18 ENCOUNTER — HOSPITAL ENCOUNTER (EMERGENCY)
Dept: HOSPITAL 1 - ED | Age: 75
LOS: 1 days | Discharge: HOME | End: 2020-06-19
Payer: COMMERCIAL

## 2020-06-18 VITALS — BODY MASS INDEX: 28.42 KG/M2 | HEIGHT: 71 IN | WEIGHT: 203 LBS

## 2020-06-18 DIAGNOSIS — Z98.890: ICD-10-CM

## 2020-06-18 DIAGNOSIS — I10: ICD-10-CM

## 2020-06-18 DIAGNOSIS — R10.30: ICD-10-CM

## 2020-06-18 DIAGNOSIS — E11.9: ICD-10-CM

## 2020-06-18 DIAGNOSIS — R31.9: Primary | ICD-10-CM

## 2020-06-18 DIAGNOSIS — Z90.89: ICD-10-CM

## 2020-06-18 LAB
BASOPHILS NFR BLD: 0.6 % (ref 0–2)
ERYTHROCYTE [DISTWIDTH] IN BLOOD BY AUTOMATED COUNT: 14.6 % (ref 11.5–14.5)
PLATELET # BLD: 291 X10^3MCL (ref 130–400)

## 2020-06-19 VITALS — DIASTOLIC BLOOD PRESSURE: 81 MMHG | SYSTOLIC BLOOD PRESSURE: 156 MMHG

## 2020-06-19 LAB
BUN SERPL-MCNC: 24 MG/DL (ref 7–18)
CALCIUM SERPL-MCNC: 8.1 MG/DL (ref 8.5–10.1)
CHLORIDE SERPL-SCNC: 102 MMOL/L (ref 98–107)
CO2 SERPL-SCNC: 23.7 MMOL/L (ref 21–32)
CREAT SERPL-MCNC: 1.3 MG/DL (ref 0.7–1.3)
GFR SERPLBLD BASED ON 1.73 SQ M-ARVRAT: (no result) ML/MIN
GLUCOSE SERPL-MCNC: 299 MG/DL (ref 74–106)
POTASSIUM SERPL-SCNC: 3.3 MMOL/L (ref 3.5–5.1)
SODIUM SERPL-SCNC: 137 MMOL/L (ref 136–145)

## 2020-10-05 LAB
BASOPHILS NFR BLD: 0.9 % (ref 0–2)
BUN SERPL-MCNC: 19 MG/DL (ref 7–18)
CALCIUM SERPL-MCNC: 8.7 MG/DL (ref 8.5–10.1)
CHLORIDE SERPL-SCNC: 102 MMOL/L (ref 98–107)
CO2 SERPL-SCNC: 26.3 MMOL/L (ref 21–32)
CREAT SERPL-MCNC: 1.1 MG/DL (ref 0.7–1.3)
ERYTHROCYTE [DISTWIDTH] IN BLOOD BY AUTOMATED COUNT: 15.1 % (ref 11.5–14.5)
GFR SERPLBLD BASED ON 1.73 SQ M-ARVRAT: (no result) ML/MIN
GLUCOSE SERPL-MCNC: 229 MG/DL (ref 74–106)
MICROSCOPIC UR-IMP: NO
PLATELET # BLD: 283 X10^3MCL (ref 130–400)
POTASSIUM SERPL-SCNC: 3.5 MMOL/L (ref 3.5–5.1)
RBC # UR STRIP.AUTO: NEGATIVE /UL
SODIUM SERPL-SCNC: 134 MMOL/L (ref 136–145)
UA SPECIFIC GRAVITY: 1.01 (ref 1–1.03)

## 2020-10-07 NOTE — NUR
FAXED RESULT OF EKG, CHEST X-RAY AND COVID RESULT TO DR NOLASCO'S OFFICE AS PER
REQUESTED BY JUANA FUNEZ THIS AM.

## 2020-10-07 NOTE — NUR
ANESTHESIOLOGIST DR MCDONALD REVIEWED LABS- CBC,BMP,PT/INR,PTT, URINALYSIS,AIC
RESULT- MD OK'D TO PROCEED WITH SURGERY.

## 2020-10-09 ENCOUNTER — HOSPITAL ENCOUNTER (OUTPATIENT)
Dept: HOSPITAL 1 - DS | Age: 75
Discharge: HOME | End: 2020-10-09
Attending: UROLOGY
Payer: COMMERCIAL

## 2020-10-09 VITALS — BODY MASS INDEX: 27.86 KG/M2 | HEIGHT: 71 IN | WEIGHT: 198.99 LBS

## 2020-10-09 VITALS — DIASTOLIC BLOOD PRESSURE: 83 MMHG | SYSTOLIC BLOOD PRESSURE: 155 MMHG

## 2020-10-09 VITALS — SYSTOLIC BLOOD PRESSURE: 166 MMHG | DIASTOLIC BLOOD PRESSURE: 83 MMHG

## 2020-10-09 DIAGNOSIS — I10: ICD-10-CM

## 2020-10-09 DIAGNOSIS — Z79.84: ICD-10-CM

## 2020-10-09 DIAGNOSIS — E11.9: ICD-10-CM

## 2020-10-09 DIAGNOSIS — N40.0: Primary | ICD-10-CM

## 2020-10-09 DIAGNOSIS — Z20.828: ICD-10-CM

## 2020-10-09 DIAGNOSIS — Z79.899: ICD-10-CM

## 2020-10-09 DIAGNOSIS — E78.00: ICD-10-CM

## 2020-10-09 DIAGNOSIS — Z79.82: ICD-10-CM
